# Patient Record
Sex: FEMALE | Race: WHITE | NOT HISPANIC OR LATINO | URBAN - METROPOLITAN AREA
[De-identification: names, ages, dates, MRNs, and addresses within clinical notes are randomized per-mention and may not be internally consistent; named-entity substitution may affect disease eponyms.]

---

## 2021-09-01 ENCOUNTER — OFFICE (OUTPATIENT)
Dept: URBAN - METROPOLITAN AREA CLINIC 102 | Facility: CLINIC | Age: 66
End: 2021-09-01
Payer: MEDICARE

## 2021-09-01 VITALS
TEMPERATURE: 98.1 F | DIASTOLIC BLOOD PRESSURE: 74 MMHG | SYSTOLIC BLOOD PRESSURE: 134 MMHG | HEIGHT: 65 IN | WEIGHT: 138 LBS | HEART RATE: 78 BPM

## 2021-09-01 DIAGNOSIS — Z86.010 PERSONAL HISTORY OF COLONIC POLYPS: ICD-10-CM

## 2021-09-01 DIAGNOSIS — K22.70 BARRETT'S ESOPHAGUS WITHOUT DYSPLASIA: ICD-10-CM

## 2021-09-01 DIAGNOSIS — K59.09 OTHER CONSTIPATION: ICD-10-CM

## 2021-09-01 DIAGNOSIS — K31.89 OTHER DISEASES OF STOMACH AND DUODENUM: ICD-10-CM

## 2021-09-01 PROCEDURE — 99204 OFFICE O/P NEW MOD 45 MIN: CPT

## 2021-09-01 NOTE — SERVICEHPINOTES
JOSE DE LEON   is a   66   year old    female who is being seen in consultation at the request of   BROCK CRUZ   for constipation, marcus's. She is here to establish care. She was previously seen by GI in [facility] and I have records to review.BREGD/colonoscopy 2/14/19--TAx2 (1cm and 6mm), reflux esophagitis with no marcus's on biopsies mild gastritis seen with IM repeat EGD and colonoscopy in 3 years recommended. BRPrior EGD in 2015 with marcus's without dysplasia. She has continued on omeprazole 40gm qAM which works well for her. Denies any breakthrough GERD sx with this. Has been on a PPI for ~30 years. Denies n/v or dysphagia. No abd pain. Her  passed away from esophageal cancer in his 50s so this has always concerned her.BRShe has dealt with constipation for years. Was diagnosed with ovarian cancer last year and finished chemo in March. Is in remission. She takes miralax 1 capful BID which has been working well for her. BMs typically 2-3x/day in the AM. With this, no bloating or abd pain. She does not feel constipated. No rectal bleeding. Her brother had colon cancer in his late 60s/early 70s. No further complaints today.

## 2022-02-11 ENCOUNTER — OFFICE (OUTPATIENT)
Dept: URBAN - METROPOLITAN AREA CLINIC 102 | Facility: CLINIC | Age: 67
End: 2022-02-11

## 2022-02-11 PROCEDURE — 00031: CPT | Performed by: INTERNAL MEDICINE

## 2022-03-08 ENCOUNTER — ON CAMPUS - OUTPATIENT (OUTPATIENT)
Dept: URBAN - METROPOLITAN AREA HOSPITAL 16 | Facility: HOSPITAL | Age: 67
End: 2022-03-08
Payer: MEDICARE

## 2022-03-08 DIAGNOSIS — K22.70 BARRETT'S ESOPHAGUS WITHOUT DYSPLASIA: ICD-10-CM

## 2022-03-08 DIAGNOSIS — K63.5 POLYP OF COLON: ICD-10-CM

## 2022-03-08 DIAGNOSIS — Z86.010 PERSONAL HISTORY OF COLONIC POLYPS: ICD-10-CM

## 2022-03-08 DIAGNOSIS — K59.09 OTHER CONSTIPATION: ICD-10-CM

## 2022-03-08 DIAGNOSIS — K29.60 OTHER GASTRITIS WITHOUT BLEEDING: ICD-10-CM

## 2022-03-08 PROCEDURE — 45380 COLONOSCOPY AND BIOPSY: CPT | Performed by: INTERNAL MEDICINE

## 2022-03-08 PROCEDURE — 43239 EGD BIOPSY SINGLE/MULTIPLE: CPT | Performed by: INTERNAL MEDICINE

## 2024-07-26 ENCOUNTER — OFFICE (OUTPATIENT)
Dept: URBAN - METROPOLITAN AREA CLINIC 102 | Facility: CLINIC | Age: 69
End: 2024-07-26
Payer: MEDICARE

## 2024-07-26 VITALS
DIASTOLIC BLOOD PRESSURE: 70 MMHG | WEIGHT: 150 LBS | SYSTOLIC BLOOD PRESSURE: 134 MMHG | HEIGHT: 65 IN | HEART RATE: 74 BPM | TEMPERATURE: 97.7 F

## 2024-07-26 DIAGNOSIS — K22.70 BARRETT'S ESOPHAGUS WITHOUT DYSPLASIA: ICD-10-CM

## 2024-07-26 DIAGNOSIS — R93.3 ABNORMAL FINDINGS ON DIAGNOSTIC IMAGING OF OTHER PARTS OF DI: ICD-10-CM

## 2024-07-26 DIAGNOSIS — K59.09 OTHER CONSTIPATION: ICD-10-CM

## 2024-07-26 PROCEDURE — 99214 OFFICE O/P EST MOD 30 MIN: CPT | Performed by: NURSE PRACTITIONER

## 2024-07-26 NOTE — SERVICEHPINOTES
JOSE DE LEON   is a   69  female who complains of constipation--has been going on for years. 
br
brBM depends on if she takes a laxative (MiraLAX)  works up to a point and then tried bisacodyl but developed pain in left lower abdomen and CT scan ordered December 2023---possible wall thickening affecting portions of the splenic flexure of the colon and the distal colon. Now is taking MiraLAX--take 5 times a week, BSS 3-4. If no MiraLAX will be BSS 1 sometimes 4 but little stool. br straining to have a BM even with Miralax---1/3 of the time. br
brDenies rectal bleeding, melena, abdominal pain, n/v, dysphagia. br
Last colonoscopy 2022- hyperplastic sigmoid polyp x 2)
br

br
br Has ovarian cancer was on chemo and dc'ed in 2021. 
br
br 
Does not see a cardiologist, no MI, no stroke. br
br
br

## 2024-12-17 ENCOUNTER — ON CAMPUS - OUTPATIENT (OUTPATIENT)
Dept: URBAN - METROPOLITAN AREA HOSPITAL 16 | Facility: HOSPITAL | Age: 69
End: 2024-12-17
Payer: MEDICARE

## 2024-12-17 DIAGNOSIS — K59.09 OTHER CONSTIPATION: ICD-10-CM

## 2024-12-17 DIAGNOSIS — R93.3 ABNORMAL FINDINGS ON DIAGNOSTIC IMAGING OF OTHER PARTS OF DI: ICD-10-CM

## 2024-12-17 DIAGNOSIS — K63.5 POLYP OF COLON: ICD-10-CM

## 2024-12-17 DIAGNOSIS — K63.89 OTHER SPECIFIED DISEASES OF INTESTINE: ICD-10-CM

## 2024-12-17 DIAGNOSIS — K64.9 UNSPECIFIED HEMORRHOIDS: ICD-10-CM

## 2024-12-17 PROCEDURE — 45380 COLONOSCOPY AND BIOPSY: CPT | Performed by: INTERNAL MEDICINE
